# Patient Record
Sex: MALE | Race: ASIAN | NOT HISPANIC OR LATINO
[De-identification: names, ages, dates, MRNs, and addresses within clinical notes are randomized per-mention and may not be internally consistent; named-entity substitution may affect disease eponyms.]

---

## 2017-05-15 ENCOUNTER — APPOINTMENT (OUTPATIENT)
Dept: OPHTHALMOLOGY | Facility: CLINIC | Age: 47
End: 2017-05-15

## 2017-05-15 PROBLEM — Z00.00 ENCOUNTER FOR PREVENTIVE HEALTH EXAMINATION: Status: ACTIVE | Noted: 2017-05-15

## 2018-12-20 ENCOUNTER — APPOINTMENT (OUTPATIENT)
Dept: OPHTHALMOLOGY | Facility: CLINIC | Age: 48
End: 2018-12-20
Payer: COMMERCIAL

## 2018-12-20 PROCEDURE — 92015 DETERMINE REFRACTIVE STATE: CPT

## 2018-12-20 PROCEDURE — 92014 COMPRE OPH EXAM EST PT 1/>: CPT

## 2020-03-02 ENCOUNTER — APPOINTMENT (OUTPATIENT)
Dept: OPHTHALMOLOGY | Facility: CLINIC | Age: 50
End: 2020-03-02
Payer: COMMERCIAL

## 2020-03-02 ENCOUNTER — NON-APPOINTMENT (OUTPATIENT)
Age: 50
End: 2020-03-02

## 2020-03-02 PROCEDURE — 92014 COMPRE OPH EXAM EST PT 1/>: CPT

## 2020-03-02 PROCEDURE — 92015 DETERMINE REFRACTIVE STATE: CPT

## 2020-06-04 ENCOUNTER — EMERGENCY (EMERGENCY)
Facility: HOSPITAL | Age: 50
LOS: 1 days | Discharge: HOME | End: 2020-06-04
Attending: EMERGENCY MEDICINE | Admitting: EMERGENCY MEDICINE
Payer: COMMERCIAL

## 2020-06-04 PROCEDURE — 93010 ELECTROCARDIOGRAM REPORT: CPT

## 2020-06-04 PROCEDURE — 99283 EMERGENCY DEPT VISIT LOW MDM: CPT | Mod: 25

## 2020-06-05 VITALS
TEMPERATURE: 98 F | OXYGEN SATURATION: 100 % | SYSTOLIC BLOOD PRESSURE: 152 MMHG | HEART RATE: 81 BPM | DIASTOLIC BLOOD PRESSURE: 111 MMHG | RESPIRATION RATE: 16 BRPM

## 2020-06-05 VITALS
SYSTOLIC BLOOD PRESSURE: 151 MMHG | DIASTOLIC BLOOD PRESSURE: 84 MMHG | RESPIRATION RATE: 16 BRPM | OXYGEN SATURATION: 100 % | HEART RATE: 82 BPM

## 2020-06-05 NOTE — ED PROVIDER NOTE - CLINICAL SUMMARY MEDICAL DECISION MAKING FREE TEXT BOX
49yo M nonsmoker with PMHx HTN on 2.5mg amlodipine QD p/w 3 days of intermittent room spinning episodes and head pressure lasting about 30min sporadically and then took BP with cuff at home and noticed it was high so came to ED.  Highest BP was at 180s/100s.  No syncope, chest pain, focal weakness or tingling, dysarthria, or other complaints.  Now feels well and asymptomatic. a/p HTN with no clinical signs of end organ damage. EKG as above. will dc home with outpatient follow up (has appmt with cardiologist tomorrow)

## 2020-06-05 NOTE — ED PROVIDER NOTE - OBJECTIVE STATEMENT
49yo M nonsmoker with PMHx HTN on 2.5mg amlodipine QD p/w 3 days of intermittent room spinning episodes and head pressure lasting about 30min sporadically and then took BP with cuff at home and noticed it was high so came to ED.  Highest BP was at 180s/100s.  No syncope, chest pain, focal weakness or tinlging, dysarthria, or other complaints.  Now feels well and asymptomatic

## 2020-06-05 NOTE — ED PROVIDER NOTE - NS ED ROS FT
Constitutional: No weakness or fatigue, no fevers or chills  Skin: No rash or pruritis  HEENT: No sore throat  Cardiovascular: No chest pain, no shortness of breath  Respiratory: No shortness of breath, no cough  Gastrointestinal: No abdominal pain, no nausea, no vomiting, no diarrhea, no constipation  Musculoskeletal: No joint pain  Genitourinary: No dysuria or hematuria  Neurological: No focal weakness or tingling

## 2020-06-05 NOTE — ED PROVIDER NOTE - PHYSICAL EXAMINATION
General: Patient in no apparent distress, AAO x 3  Skin: Dry and intact  HEENT: Head atraumatic. Oral mucosa moist. No pharyngeal exudates or tonsillar enlargement  Eyes: Conjunctiva normal  Cardiac: Regular rhythm and rate. No pretibial edema b/l  Respiratory: Lungs clear b/l and symmetric. No respiratory distress. Able to speak in complete sentences.  Gastrointestinal: Abdomen soft, nondistended, nontender  Musculoskeletal: Moves all extremities spontaneously  Neurological: alert and oriented to person, place, and time  Psychiatric: Cooperative

## 2020-06-05 NOTE — ED ADULT NURSE NOTE - NSIMPLEMENTINTERV_GEN_ALL_ED
Implemented All Universal Safety Interventions:  [place] to call system. Call bell, personal items and telephone within reach. Instruct patient to call for assistance. Room bathroom lighting operational. Non-slip footwear when patient is off stretcher. Physically safe environment: no spills, clutter or unnecessary equipment. Stretcher in lowest position, wheels locked, appropriate side rails in place.

## 2020-06-05 NOTE — ED ADULT NURSE NOTE - OBJECTIVE STATEMENT
Pt arrived to room 14.  Pt reports that his BP has been elevated the past few days but at times is not that high.  Pt denies chest pain.  Pt assessed by Attending MD.  EKG performed at bedside by PCA.  Pt BP documented on vitals flowsheet.  Pt arrived with walking boot to right lower extremity as a result od fall at work in April due to poor lighting.  Pt able to ambulate without assistance.

## 2020-06-05 NOTE — ED ADULT NURSE NOTE - CHPI ED NUR SYMPTOMS NEG
no pain/no tingling/no decreased eating/drinking/no dizziness/no fever/no weakness/no chills/no nausea/no vomiting

## 2020-06-05 NOTE — ED ADULT TRIAGE NOTE - CHIEF COMPLAINT QUOTE
Pt arrives with c/o " My pressure is going up...has been high for last 3 days...and I get dizzy. Not dizzy now.' Hx of HTN pt on amlodipine

## 2020-06-05 NOTE — ED ADULT NURSE NOTE - CAS TRG GENERAL NORM CIRC DET
----- Message from LEODAN Kumar sent at 9/28/2017  9:47 AM PDT -----  Could you please call and let them know allergy test is positive for trees, grass and weeds, mold and dog, dust mite  The highest is to dogs.  Thx.   Strong peripheral pulses

## 2020-06-05 NOTE — ED PROVIDER NOTE - PATIENT PORTAL LINK FT
You can access the FollowMyHealth Patient Portal offered by [facility] by registering at the following website: [email or web address]. By joining [facility]’s FollowMyHealth portal, you will also be able to view your health information using other applications (apps) compatible with our system.

## 2020-06-05 NOTE — ED PROVIDER NOTE - NSFOLLOWUPINSTRUCTIONS_ED_ALL_ED_FT
You are discharged to go home  Please return to the Emergency Room if your symptoms change or worsen    Please follow up with your PMD for your appointment tomorrow to recheck your blood pressure.

## 2022-11-11 PROBLEM — I10 ESSENTIAL (PRIMARY) HYPERTENSION: Chronic | Status: ACTIVE | Noted: 2020-06-05

## 2022-11-15 ENCOUNTER — OUTPATIENT (OUTPATIENT)
Dept: OUTPATIENT SERVICES | Facility: HOSPITAL | Age: 52
LOS: 1 days | End: 2022-11-15

## 2022-11-15 VITALS
DIASTOLIC BLOOD PRESSURE: 82 MMHG | SYSTOLIC BLOOD PRESSURE: 147 MMHG | HEIGHT: 67 IN | WEIGHT: 233.91 LBS | HEART RATE: 77 BPM | TEMPERATURE: 99 F | OXYGEN SATURATION: 98 % | RESPIRATION RATE: 18 BRPM

## 2022-11-15 DIAGNOSIS — Q40.1 CONGENITAL HIATUS HERNIA: ICD-10-CM

## 2022-11-15 DIAGNOSIS — G47.30 SLEEP APNEA, UNSPECIFIED: ICD-10-CM

## 2022-11-15 DIAGNOSIS — Z12.11 ENCOUNTER FOR SCREENING FOR MALIGNANT NEOPLASM OF COLON: ICD-10-CM

## 2022-11-15 DIAGNOSIS — Z86.69 PERSONAL HISTORY OF OTHER DISEASES OF THE NERVOUS SYSTEM AND SENSE ORGANS: Chronic | ICD-10-CM

## 2022-11-15 LAB
ANION GAP SERPL CALC-SCNC: 13 MMOL/L — SIGNIFICANT CHANGE UP (ref 7–14)
BUN SERPL-MCNC: 13 MG/DL — SIGNIFICANT CHANGE UP (ref 7–23)
CALCIUM SERPL-MCNC: 9.3 MG/DL — SIGNIFICANT CHANGE UP (ref 8.4–10.5)
CHLORIDE SERPL-SCNC: 103 MMOL/L — SIGNIFICANT CHANGE UP (ref 98–107)
CO2 SERPL-SCNC: 23 MMOL/L — SIGNIFICANT CHANGE UP (ref 22–31)
CREAT SERPL-MCNC: 0.9 MG/DL — SIGNIFICANT CHANGE UP (ref 0.5–1.3)
EGFR: 102 ML/MIN/1.73M2 — SIGNIFICANT CHANGE UP
GLUCOSE SERPL-MCNC: 78 MG/DL — SIGNIFICANT CHANGE UP (ref 70–99)
HCT VFR BLD CALC: 51 % — HIGH (ref 39–50)
HGB BLD-MCNC: 16.4 G/DL — SIGNIFICANT CHANGE UP (ref 13–17)
MCHC RBC-ENTMCNC: 25.8 PG — LOW (ref 27–34)
MCHC RBC-ENTMCNC: 32.2 GM/DL — SIGNIFICANT CHANGE UP (ref 32–36)
MCV RBC AUTO: 80.2 FL — SIGNIFICANT CHANGE UP (ref 80–100)
NRBC # BLD: 0 /100 WBCS — SIGNIFICANT CHANGE UP (ref 0–0)
NRBC # FLD: 0 K/UL — SIGNIFICANT CHANGE UP (ref 0–0)
PLATELET # BLD AUTO: 243 K/UL — SIGNIFICANT CHANGE UP (ref 150–400)
POTASSIUM SERPL-MCNC: 3.9 MMOL/L — SIGNIFICANT CHANGE UP (ref 3.5–5.3)
POTASSIUM SERPL-SCNC: 3.9 MMOL/L — SIGNIFICANT CHANGE UP (ref 3.5–5.3)
RBC # BLD: 6.36 M/UL — HIGH (ref 4.2–5.8)
RBC # FLD: 13.6 % — SIGNIFICANT CHANGE UP (ref 10.3–14.5)
SODIUM SERPL-SCNC: 139 MMOL/L — SIGNIFICANT CHANGE UP (ref 135–145)
WBC # BLD: 7.57 K/UL — SIGNIFICANT CHANGE UP (ref 3.8–10.5)
WBC # FLD AUTO: 7.57 K/UL — SIGNIFICANT CHANGE UP (ref 3.8–10.5)

## 2022-11-15 PROCEDURE — 93010 ELECTROCARDIOGRAM REPORT: CPT

## 2022-11-15 NOTE — H&P PST ADULT - PROBLEM SELECTOR PLAN 1
Routine endoscopy/ colonoscopy 12/1/22    CBC BMP EKG    preop instructions given and explained. Preop covid testing protocol reviewed with pt

## 2022-11-15 NOTE — H&P PST ADULT - HISTORY OF PRESENT ILLNESS
51 y/o male with hx of HTN, hyperlipidemia, Sleep Apnea, asthma, obesity.  pt scheduled for routine endoscopy/ colonoscopy

## 2022-11-15 NOTE — H&P PST ADULT - ANESTHESIA, PREVIOUS REACTION, PROFILE
when he had achilles tendon  repair (R)/delayed awakening delayed awakening when he had achilles tendon  repair (R)/delayed awakening

## 2022-11-15 NOTE — H&P PST ADULT - NSICDXPASTMEDICALHX_GEN_ALL_CORE_FT
PAST MEDICAL HISTORY:  Hypertension      PAST MEDICAL HISTORY:  History of BPH     Hyperlipidemia     Hypertension     Obesity     Sleep apnea

## 2022-11-29 ENCOUNTER — TRANSCRIPTION ENCOUNTER (OUTPATIENT)
Age: 52
End: 2022-11-29

## 2022-12-01 ENCOUNTER — RESULT REVIEW (OUTPATIENT)
Age: 52
End: 2022-12-01

## 2022-12-01 ENCOUNTER — OUTPATIENT (OUTPATIENT)
Dept: OUTPATIENT SERVICES | Facility: HOSPITAL | Age: 52
LOS: 1 days | Discharge: HOME | End: 2022-12-01

## 2022-12-01 VITALS
HEART RATE: 69 BPM | RESPIRATION RATE: 18 BRPM | TEMPERATURE: 98 F | DIASTOLIC BLOOD PRESSURE: 81 MMHG | SYSTOLIC BLOOD PRESSURE: 132 MMHG | HEIGHT: 70 IN | OXYGEN SATURATION: 98 % | WEIGHT: 237 LBS

## 2022-12-01 VITALS
OXYGEN SATURATION: 95 % | RESPIRATION RATE: 22 BRPM | SYSTOLIC BLOOD PRESSURE: 118 MMHG | DIASTOLIC BLOOD PRESSURE: 79 MMHG | HEART RATE: 73 BPM

## 2022-12-01 DIAGNOSIS — Q40.1 CONGENITAL HIATUS HERNIA: ICD-10-CM

## 2022-12-01 DIAGNOSIS — Z86.69 PERSONAL HISTORY OF OTHER DISEASES OF THE NERVOUS SYSTEM AND SENSE ORGANS: Chronic | ICD-10-CM

## 2022-12-01 PROCEDURE — 88305 TISSUE EXAM BY PATHOLOGIST: CPT | Mod: 26

## 2022-12-01 NOTE — ASU PATIENT PROFILE, ADULT - NSICDXPASTMEDICALHX_GEN_ALL_CORE_FT
PAST MEDICAL HISTORY:  History of BPH     Hyperlipidemia     Hypertension     Obesity     Sleep apnea

## 2022-12-05 LAB — SURGICAL PATHOLOGY STUDY: SIGNIFICANT CHANGE UP

## (undated) DEVICE — CLAMP BX HOT RAD JAW 3

## (undated) DEVICE — BITE BLOCK ADULT 20 X 27MM (GREEN)

## (undated) DEVICE — SALIVA EJECTOR (BLUE)

## (undated) DEVICE — PACK IV START WITH CHG

## (undated) DEVICE — GOWN LG

## (undated) DEVICE — DRSG 2X2

## (undated) DEVICE — ELCTR GROUNDING PAD ADULT COVIDIEN

## (undated) DEVICE — BIOPSY FORCEP COLD DISP

## (undated) DEVICE — CONTAINER FORMALIN 10% 20ML

## (undated) DEVICE — ELCTR ECG CONDUCTIVE ADHESIVE

## (undated) DEVICE — TUBING IV SET GRAVITY 3Y 100" MACRO

## (undated) DEVICE — BASIN EMESIS 10IN GRADUATED MAUVE

## (undated) DEVICE — CATH IV SAFE BC 22G X 1" (BLUE)

## (undated) DEVICE — LUBRICATING JELLY HR ONE SHOT 3G

## (undated) DEVICE — UNDERPAD LINEN SAVER 17 X 24"

## (undated) DEVICE — DRSG CURITY GAUZE SPONGE 4 X 4" 12-PLY NON-STERILE

## (undated) DEVICE — DRSG BANDAID 0.75X3"

## (undated) DEVICE — BIOPSY FORCEP RADIAL JAW 4 STANDARD WITH NEEDLE

## (undated) DEVICE — DENTURE CUP PINK

## (undated) DEVICE — LINE BREATHE SAMPLNG

## (undated) DEVICE — CONTAINER FORMALIN 80ML YELLOW

## (undated) DEVICE — TUBING MEDI-VAC W MAXIGRIP CONNECTORS 1/4"X6'

## (undated) DEVICE — TUBING SUCTION NONCONDUCTIVE 6MM X 12FT